# Patient Record
Sex: MALE | Race: WHITE
[De-identification: names, ages, dates, MRNs, and addresses within clinical notes are randomized per-mention and may not be internally consistent; named-entity substitution may affect disease eponyms.]

---

## 2019-04-23 ENCOUNTER — HOSPITAL ENCOUNTER (INPATIENT)
Dept: HOSPITAL 74 - JER | Age: 39
LOS: 2 days | Discharge: ANOTHER HOSPITAL | DRG: 198 | End: 2019-04-25
Attending: INTERNAL MEDICINE | Admitting: INTERNAL MEDICINE
Payer: COMMERCIAL

## 2019-04-23 VITALS — BODY MASS INDEX: 36.8 KG/M2

## 2019-04-23 DIAGNOSIS — F17.210: ICD-10-CM

## 2019-04-23 DIAGNOSIS — E66.9: ICD-10-CM

## 2019-04-23 DIAGNOSIS — E11.9: ICD-10-CM

## 2019-04-23 DIAGNOSIS — E78.5: ICD-10-CM

## 2019-04-23 DIAGNOSIS — R07.89: ICD-10-CM

## 2019-04-23 DIAGNOSIS — I20.0: Primary | ICD-10-CM

## 2019-04-23 DIAGNOSIS — I10: ICD-10-CM

## 2019-04-23 LAB
ALBUMIN SERPL-MCNC: 3.9 G/DL (ref 3.4–5)
ALP SERPL-CCNC: 77 U/L (ref 45–117)
ALT SERPL-CCNC: 37 U/L (ref 13–61)
ANION GAP SERPL CALC-SCNC: 5 MMOL/L (ref 8–16)
APTT BLD: 31.7 SECONDS (ref 25.2–36.5)
AST SERPL-CCNC: 18 U/L (ref 15–37)
BASOPHILS # BLD: 0.5 % (ref 0–2)
BILIRUB SERPL-MCNC: 0.4 MG/DL (ref 0.2–1)
BNP SERPL-MCNC: 19.4 PG/ML (ref 5–125)
BUN SERPL-MCNC: 13 MG/DL (ref 7–18)
CALCIUM SERPL-MCNC: 8.9 MG/DL (ref 8.5–10.1)
CHLORIDE SERPL-SCNC: 104 MMOL/L (ref 98–107)
CO2 SERPL-SCNC: 25 MMOL/L (ref 21–32)
CREAT SERPL-MCNC: 0.7 MG/DL (ref 0.55–1.3)
DEPRECATED RDW RBC AUTO: 13.3 % (ref 11.9–15.9)
EOSINOPHIL # BLD: 3.3 % (ref 0–4.5)
GLUCOSE SERPL-MCNC: 169 MG/DL (ref 74–106)
GLUCOSE SERPL-MCNC: 283 MG/DL (ref 74–106)
HCT VFR BLD CALC: 44.2 % (ref 35.4–49)
HGB BLD-MCNC: 15.3 GM/DL (ref 11.7–16.9)
INR BLD: 1.04 (ref 0.83–1.09)
LYMPHOCYTES # BLD: 42.1 % (ref 8–40)
MCH RBC QN AUTO: 31 PG (ref 25.7–33.7)
MCHC RBC AUTO-ENTMCNC: 34.5 G/DL (ref 32–35.9)
MCV RBC: 89.9 FL (ref 80–96)
MONOCYTES # BLD AUTO: 8.4 % (ref 3.8–10.2)
NEUTROPHILS # BLD: 45.7 % (ref 42.8–82.8)
PLATELET # BLD AUTO: 197 K/MM3 (ref 134–434)
PMV BLD: 8.2 FL (ref 7.5–11.1)
POTASSIUM SERPLBLD-SCNC: 4.4 MMOL/L (ref 3.5–5.1)
PROT SERPL-MCNC: 7.9 G/DL (ref 6.4–8.2)
PT PNL PPP: 12.3 SEC (ref 9.7–13)
RBC # BLD AUTO: 4.92 M/MM3 (ref 4–5.6)
SODIUM SERPL-SCNC: 135 MMOL/L (ref 136–145)
WBC # BLD AUTO: 7.9 K/MM3 (ref 4–10)

## 2019-04-23 PROCEDURE — A9502 TC99M TETROFOSMIN: HCPCS

## 2019-04-23 RX ADMIN — HEPARIN SODIUM SCH UNIT: 5000 INJECTION, SOLUTION INTRAVENOUS; SUBCUTANEOUS at 21:42

## 2019-04-23 RX ADMIN — ATORVASTATIN CALCIUM SCH MG: 40 TABLET, FILM COATED ORAL at 21:40

## 2019-04-23 RX ADMIN — INSULIN ASPART SCH UNITS: 100 INJECTION, SOLUTION INTRAVENOUS; SUBCUTANEOUS at 18:48

## 2019-04-23 RX ADMIN — INSULIN ASPART SCH UNITS: 100 INJECTION, SOLUTION INTRAVENOUS; SUBCUTANEOUS at 21:43

## 2019-04-23 NOTE — PN
Teaching Attending Note


Name of Resident: Mary Beth Ortega





ATTENDING PHYSICIAN STATEMENT





I saw and evaluated the patient.


I reviewed the resident's note and discussed the case with the resident.


I agree with the resident's findings and plan as documented.








SUBJECTIVE: Left sided chest pain








OBJECTIVE:





 Vital Signs











Temperature  97.8 F   19 07:41


 


Pulse Rate  71   19 09:52


 


Respiratory Rate  16   19 09:52


 


Blood Pressure  111/79   19 09:52


 


O2 Sat by Pulse Oximetry (%)  71 L  19 09:52











Young man not in distress, chest pain almost resolved





HEENT: Mm moist, no anemia, PERRLA EOMI





NECK: No JVd No Bruit





CHEST: CTA B/L





CVS: S1S2 R no m/g/r





ABD: No distention, non tender Bs +





EXT: No edema feet, no calf tenderness, CNS' AOX3 non focal





LABS:


 CBC, BMP





 19 08:55 





 19 08:55 





EKG: NSR at 66/mt R no acute ST T changes





ASSESSMENT AND PLAN: 38 yrs old man  H/O HTN, T2DM, , unlimited Et present with 

Left sided pressure like pain with perspiration and radiation to Left UE and 

back lasted 4-5 hrs, developed while sleeping 8/10 with SOB , pain gradually 

decrease in intensity came to Ed for evaluation received NTG symptoms  improved 

after S/L NTG, at the tme of examination c/o  some minimal discomfort and head 

ache, hemodynamically stable, EKG  no acute ST T changes Ist CE -ve, as per 

patient previously  + NST 9 months ago in [place]  and he was advised cardiac 

cath





CHEST Pain: Typical chest pain with multiple CAD risk factors, T2DM, HTN, 

smoking FHO Premature CAD (father  at the age of 59 with MI) admitted for 

evaluation of chest pain, Card Tele, ASA 81 mg Lipitor 40 mg daily serial CE, 

ECHO, optimize BP control , F/U cardiology input Card recommended Stress test 

in am if  serial CE are normal and patient stays CP free, 





HTN: Resume home meds or equivalent 





T2DM: F/U Lipid panel , TSH, HBA1C , Correction dose insulin.





SMOKING Cessation: Educate to stop smoking offerred Nicotine GUM








.>

## 2019-04-23 NOTE — PDOC
Attending Attestation





- Resident


Resident Name: Migdalia Miranda





- ED Attending Attestation


I have performed the following: I have examined & evaluated the patient, The 

case was reviewed & discussed with the resident, I agree w/resident's findings 

& plan





- HPI


HPI: 





04/23/19 10:11


38-year-old male with history of hypertension, high cholesterol, smoking history

, reportedly known CAD on stress test performed in his home country about 9 

months ago (referred for catheterization at that time but wanted to follow-up 

in the [place]) presents now for first episode of chest pain since that 

stress test 9 months ago. Patient was resting at home last night, developed 

left chest/shoulder discomfort with nausea and diaphoresis, mild throughout the 

night and awoke this morning with increased pain today presents for evaluation.


At baseline, patient has no exercise limitations and denies any exertional 

chest pain or dyspnea.





- Physicial Exam


PE: 





04/23/19 10:12


Vital signs are within normal limits


Patient seated comfortably in stretcher, diaphoretic upon arrival, EKG showed 

no acute ischemia


patient was given nitroglycerin with improved symptoms


Heart is regular without murmur, lungs are clear


No extremity edema or calf tenderness








- Medical Decision Making





04/23/19 10:13


38-year-old male with hypertension, diabetes, smoking history, positive family 

history and reportedly known CAD from stress test last year presents now with 

chest pain concerning for an unstable angina.


Labs, EKG, chest x-ray


asa, ntg


tele admit





04/23/19 11:45


labs wnl


case d/w Dr. Garrett of cardiology, agrees with tele admit


Accepted for inpt tele by Dr. Gagnon





**Heart Score/ECG Review





- History


History: Highly suspicious





- Electrocardiogram


EKG: Non specific repolarization disturbance





- Age


Age: </= 45





- Risk Factors


Risk Factors Heart Score: Yes Hx Hypertension, Yes Hx Diabetes, Yes Smoking 

History, Yes Positive family hx of cardiac disease


Based on the list above the patient has:: >/=3 risk factors or Hx 

atherosclerotic disease





- Troponin


Troponin: </= normal limit





- Score


Heart Score - Total: 5


  ** #1


ECG reviewed & interpreted by me at: 07:41


General ECG Interpretation: Sinus Rhythm, Normal Rate (66), Normal Intervals (

qtc 408), No acute ischemic changes (inf flat T, septal Q V1V2)

## 2019-04-23 NOTE — CON.CARD
Cardiology Consult (text)





- Consultation


Consultation Note: 





cc: cp





hpi:  38 m hx htn, hld, dm, smoking, here with cp.  Woke up early this morning 

and had left chest tightness with left arm numbness.  Mild sob as well.  No 

dizzy, loc, pnd, orthopnea, le edema.  Came to ER and got sl nitro and sxs 

resolved an hour later.  Feels well now.  Had similar episode last year when 

[place]seas and reports having abnl stress test there but chose not to have cath 

then.  





pmh: per hpi


psh: no surgery


social: +tob


fam: dad mi 58


ros: per hpi; all others normal


meds:


 Home Medications











 Medication  Instructions  Recorded


 


Nebivolol HCl [Bystolic] 10 mg PO DAILY 04/23/19


 


Pitavastatin Calcium [Livalo] 2 mg PO DAILY 04/23/19








pe:


 Vital Signs (72 hours)











  04/23/19 04/23/19 04/23/19





  07:41 08:15 09:24


 


Temperature 97.8 F  


 


Pulse Rate 74  


 


Pulse Rate [   65





Apical]   


 


Respiratory 18  16





Rate   


 


Blood Pressure 134/89  


 


Blood Pressure   135/89





[Left Arm]   


 


O2 Sat by Pulse 98 99 99





Oximetry (%)   














  04/23/19





  09:52


 


Temperature 


 


Pulse Rate 


 


Pulse Rate [ 71





Apical] 


 


Respiratory 16





Rate 


 


Blood Pressure 


 


Blood Pressure 111/79





[Left Arm] 


 


O2 Sat by Pulse 71 L





Oximetry (%) 








nad no jvd


rrr s1s2 no mrg


cta bl nl eff


aao3


no le e/c/c


abd nt nd pos bs 


no jaundice diaphoresis


pos dp pt no carotid bruits





 Laboratory Last Values











WBC  7.9 K/mm3 (4.0-10.0)   04/23/19  08:55    


 


RBC  4.92 M/mm3 (4.00-5.60)   04/23/19  08:55    


 


Hgb  15.3 GM/dL (11.7-16.9)   04/23/19  08:55    


 


Hct  44.2 % (35.4-49)   04/23/19  08:55    


 


MCV  89.9 fl (80-96)   04/23/19  08:55    


 


MCH  31.0 pg (25.7-33.7)   04/23/19  08:55    


 


MCHC  34.5 g/dl (32.0-35.9)   04/23/19  08:55    


 


RDW  13.3 % (11.9-15.9)   04/23/19  08:55    


 


Plt Count  197 K/MM3 (134-434)   04/23/19  08:55    


 


MPV  8.2 fl (7.5-11.1)   04/23/19  08:55    


 


Absolute Neuts (auto)  3.6 K/mm3 (1.5-8.0)   04/23/19  08:55    


 


Neutrophils %  45.7 % (42.8-82.8)   04/23/19  08:55    


 


Lymphocytes %  42.1 % (8-40)  H  04/23/19  08:55    


 


Monocytes %  8.4 % (3.8-10.2)   04/23/19  08:55    


 


Eosinophils %  3.3 % (0-4.5)   04/23/19  08:55    


 


Basophils %  0.5 % (0-2.0)   04/23/19  08:55    


 


Nucleated RBC %  0 % (0-0)   04/23/19  08:55    


 


PT with INR  12.30 SEC (9.7-13.0)   04/23/19  08:55    


 


INR  1.04  (0.83-1.09)   04/23/19  08:55    


 


PTT (Actin FS)  31.7 SECONDS (25.2-36.5)   04/23/19  08:55    


 


Sodium  135 mmol/L (136-145)  L  04/23/19  08:55    


 


Potassium  4.4 mmol/L (3.5-5.1)   04/23/19  08:55    


 


Chloride  104 mmol/L ()   04/23/19  08:55    


 


Carbon Dioxide  25 mmol/L (21-32)   04/23/19  08:55    


 


Anion Gap  5 MMOL/L (8-16)  L  04/23/19  08:55    


 


BUN  13 mg/dL (7-18)   04/23/19  08:55    


 


Creatinine  0.7 mg/dL (0.55-1.3)   04/23/19  08:55    


 


Creat Clearance w eGFR  126.90  (>60)   04/23/19  08:55    


 


Random Glucose  169 mg/dL ()  H  04/23/19  08:55    


 


Calcium  8.9 mg/dL (8.5-10.1)   04/23/19  08:55    


 


Total Bilirubin  0.4 mg/dL (0.2-1)   04/23/19  08:55    


 


AST  18 U/L (15-37)   04/23/19  08:55    


 


ALT  37 U/L (13-61)   04/23/19  08:55    


 


Alkaline Phosphatase  77 U/L ()   04/23/19  08:55    


 


Creatine Kinase  70 U/L ()   04/23/19  08:55    


 


Troponin I  < 0.02 ng/ml (0.00-0.05)   04/23/19  08:55    


 


B-Natriuretic Peptide  19.4 pg/ml (5-125)   04/23/19  08:55    


 


Total Protein  7.9 g/dl (6.4-8.2)   04/23/19  08:55    


 


Albumin  3.9 g/dl (3.4-5.0)   04/23/19  08:55    








ecg: sr nl intervals no ischemic changes





cxr: clear lungs








a/p:  38 m hx htn, hld, dm, smoking, here with cp.





cp:


-some typical features and pt has cad risk factors


-ecg unremarkable and first set enzymes wnl.  no further cp at this time.


-would continue to dawson, monitor on tele.  will check echo and nuclear stress 

test tomorrow if CE's remain normal.


-cont asa





htn:


-cont home bystolic





hld:


-cont home statin





tob use:


-smoking cessation discussed

## 2019-04-23 NOTE — PDOC
History of Present Illness





- General


Chief Complaint: Chest Pain


Stated Complaint: HIGH BP


Time Seen by Provider: 19 08:13





- History of Present Illness


Initial Comments: 


39yo M with PMH of HTN, DM, 15 pack-year smoker presenting with chest pain. 

Patient states he had this pain starting around 2am, described as "heaviness" 

and rated 7/10. The pain radiates to his left arm. Endorses diaphoresis, but no 

nausea or vomiting. His father  of a heart attack at age 58. The pain 

worsens when he lays flat, and nothing makes it better. He currently endorses a 

"burning" on the left side of his chest that travels up and down. Patient has 

had this pain before, about three or four months ago when he was in [place]. 

Patient was evaluated by a cardiologist there with a stress test and was 

recommended for what sounds like a catheterization, however patient opted to 

wait to get evaluated when he returned to the [place]. Patient does not have an 

established primary care physician or cardiologist here. No hemoptysis, no 

recent surgical history, no recent immobilization, no hormone use, no history 

of DVT or PE. He also notes a high blood pressure reading at home that was "110/

160." Denies fevers, chills, or abdominal pain. 





PCP: none (though his PCP is lasted as Dr. Reddy Seymour, patient does not 

recognize the name and has not seen a doctor in the [place] in years)


Cardio: none








Past History





- Past Medical History


Allergies/Adverse Reactions: 


 Allergies











Allergy/AdvReac Type Severity Reaction Status Date / Time


 


No Known Allergies Allergy   Verified 19 07:43











Home Medications: 


Ambulatory Orders





Nebivolol HCl [Bystolic] 10 mg PO DAILY 19 


Pitavastatin Calcium [Livalo] 2 mg PO DAILY 19 








COPD: No


Diabetes: Yes


HTN: Yes





- Surgical History


GI Surgery: No





- Immunization History


Immunization Up to Date: No





- Suicide/Smoking/Psychosocial Hx


Smoking History: Current every day smoker


Have you smoked in the past 12 months: Yes


Number of Cigarettes Smoked Daily: 12


Information on smoking cessation initiated: No


Hx Alcohol Use: No


Drug/Substance Use Hx: No





**Review of Systems





- Review of Systems


Comments:: 


Constitutional: no fever, +diaphoresis


HEENT: no throat pain, no dysphagia


Cardiovascular: +chest pain, no palpitations


Respiratory: no cough, no shortness of breath


Gastrointestinal: no abdominal pain, no nausea


Genitourinary: no dysuria, no frequency


Musculoskeletal: no myalgia, no arthralgia


Skin: no rash, no itching


Neurologic: no headache, +weakness








*Physical Exam





- Vital Signs


 Last Vital Signs











Temp Pulse Resp BP Pulse Ox


 


 97.8 F   74   18   134/89   98 


 


 19 07:41  19 07:41  19 07:41  19 07:41  19 07:41














- Physical Exam


Comments: 


General: Awake, alert, and fully oriented, in no acute distress


Head: No signs of trauma


Eyes: EOMI, sclera anicteric


ENT: Moist mucus membranes


Neck: Normal ROM, supple


Lungs: Lungs clear, Normal breath sounds


Cardio: Regular rhythm, S1 and S2 present


Abdomen: Soft, nontender. No guarding, no rebound, no masses


Extremities: Normal range of motion, Distal pulses present


SKIN: Warm, Dry, normal turgor


Neurologic: Cranial nerves II through XII grossly intact. Normal speech








ED Treatment Course





- LABORATORY


CBC & Chemistry Diagram: 


 19 08:55





 19 16:59





Medical Decision Making





- Medical Decision Making


39yo M with PMH of HTN, DM presenting with chest pain.


DDX including but not limited to ACS, Aortic dissection, Pericarditis, PE, PNA, 

MSK


Cardiac workup


324mg ASA


EKG: rate 66, QTc 408, NSR, flattened t/ twi in leads II, III, avF (no prior 

ekgs in chart)


19 08:46





HEART score is at least 4 given risk factors (smoker, HTN, DM, family history), 

abnormal EKG, and history (moderately suspicious)


CXR without acute pathology, my impression


Awaiting Tpn and other labs


Likely Telemetry obs admission for r/o ACS


19 09:26








 CBC











WBC  7.9 K/mm3 (4.0-10.0)   19  08:55    


 


RBC  4.92 M/mm3 (4.00-5.60)   19  08:55    


 


Hgb  15.3 GM/dL (11.7-16.9)   19  08:55    


 


Hct  44.2 % (35.4-49)   19  08:55    


 


MCV  89.9 fl (80-96)   19  08:55    


 


MCH  31.0 pg (25.7-33.7)   19  08:55    


 


MCHC  34.5 g/dl (32.0-35.9)   19  08:55    


 


RDW  13.3 % (11.9-15.9)   19  08:55    


 


Plt Count  197 K/MM3 (134-434)   19  08:55    


 


MPV  8.2 fl (7.5-11.1)   19  08:55    


 


Absolute Neuts (auto)  3.6 K/mm3 (1.5-8.0)   19  08:55    


 


Neutrophils %  45.7 % (42.8-82.8)   19  08:55    


 


Lymphocytes %  42.1 % (8-40)  H  19  08:55    


 


Monocytes %  8.4 % (3.8-10.2)   19  08:55    


 


Eosinophils %  3.3 % (0-4.5)   19  08:55    


 


Basophils %  0.5 % (0-2.0)   19  08:55    


 


Nucleated RBC %  0 % (0-0)   19  08:55    








No anemia or leukocytosis





19 09:41








 CMP











Sodium  135 mmol/L (136-145)  L  19  08:55    


 


Potassium  4.4 mmol/L (3.5-5.1)   19  08:55    


 


Chloride  104 mmol/L ()   19  08:55    


 


Carbon Dioxide  25 mmol/L (21-32)   19  08:55    


 


Anion Gap  5 MMOL/L (8-16)  L  19  08:55    


 


BUN  13 mg/dL (7-18)   19  08:55    


 


Creatinine  0.7 mg/dL (0.55-1.3)   19  08:55    


 


Creat Clearance w eGFR  126.90  (>60)   19  08:55    


 


Random Glucose  169 mg/dL ()  H  19  08:55    


 


Calcium  8.9 mg/dL (8.5-10.1)   19  08:55    


 


Total Bilirubin  0.4 mg/dL (0.2-1)   19  08:55    


 


AST  18 U/L (15-37)   19  08:55    


 


ALT  37 U/L (13-61)   19  08:55    


 


Alkaline Phosphatase  77 U/L ()   19  08:55    


 


Creatine Kinase  70 U/L ()   19  08:55    


 


Troponin I  < 0.02 ng/ml (0.00-0.05)   19  08:55    


 


Total Protein  7.9 g/dl (6.4-8.2)   19  08:55    


 


Albumin  3.9 g/dl (3.4-5.0)   19  08:55    








Tpn <0.02 undetectable


Electrolytes unremarkable





Patient reports some alleviation of chest pain after receiving sublingual nitro

, now rated 3/10


Hospitalist team paged


19 10:57





Dr. Duggan requested consultation with cardiology to determine whether patient 

would be better served at a center with a cardiac cath. 


Dr. Vance discussed case with cardio who recommended tele observation as 

patient has no cardiac stress testing/ECHO in this country.


Dr. Duggan made aware


Admission order placed








*DC/Admit/Observation/Transfer


Diagnosis at time of Disposition: 


Chest pain


Qualifiers:


 Chest pain type: unspecified Qualified Code(s): R07.9 - Chest pain, unspecified








- Discharge Dispostion


Condition at time of disposition: Guarded


Decision to Admit order: Yes





- Referrals





- Patient Instructions





- Post Discharge Activity

## 2019-04-23 NOTE — EKG
Test Reason : 

Blood Pressure : ***/*** mmHG

Vent. Rate : 066 BPM     Atrial Rate : 066 BPM

   P-R Int : 174 ms          QRS Dur : 092 ms

    QT Int : 390 ms       P-R-T Axes : -08 001 -04 degrees

   QTc Int : 408 ms

 

NORMAL SINUS RHYTHM

SEPTAL INFARCT , AGE UNDETERMINED

ABNORMAL ECG

Confirmed by MD MAL, PATRIZIA ([id number]) on 4/23/2019 12:25:55 PM

 

Referred By:             Confirmed By:PATRIZIA RESENDEZ MD

## 2019-04-24 LAB
ALBUMIN SERPL-MCNC: 3.7 G/DL (ref 3.4–5)
ALP SERPL-CCNC: 95 U/L (ref 45–117)
ALT SERPL-CCNC: 42 U/L (ref 13–61)
ANION GAP SERPL CALC-SCNC: 6 MMOL/L (ref 8–16)
APTT BLD: 34.3 SECONDS (ref 25.2–36.5)
AST SERPL-CCNC: 21 U/L (ref 15–37)
BASOPHILS # BLD: 0.8 % (ref 0–2)
BILIRUB SERPL-MCNC: 0.2 MG/DL (ref 0.2–1)
BUN SERPL-MCNC: 15 MG/DL (ref 7–18)
CALCIUM SERPL-MCNC: 8.8 MG/DL (ref 8.5–10.1)
CHLORIDE SERPL-SCNC: 102 MMOL/L (ref 98–107)
CHOLEST SERPL-MCNC: 170 MG/DL (ref 50–200)
CO2 SERPL-SCNC: 27 MMOL/L (ref 21–32)
CREAT SERPL-MCNC: 0.6 MG/DL (ref 0.55–1.3)
DEPRECATED RDW RBC AUTO: 13 % (ref 11.9–15.9)
EOSINOPHIL # BLD: 3.4 % (ref 0–4.5)
GLUCOSE SERPL-MCNC: 206 MG/DL (ref 74–106)
HCT VFR BLD CALC: 42.7 % (ref 35.4–49)
HDLC SERPL-MCNC: 19 MG/DL (ref 40–60)
HGB BLD-MCNC: 14.7 GM/DL (ref 11.7–16.9)
INR BLD: 1.01 (ref 0.83–1.09)
LYMPHOCYTES # BLD: 49.8 % (ref 8–40)
MAGNESIUM SERPL-MCNC: 2.1 MG/DL (ref 1.8–2.4)
MCH RBC QN AUTO: 31.2 PG (ref 25.7–33.7)
MCHC RBC AUTO-ENTMCNC: 34.5 G/DL (ref 32–35.9)
MCV RBC: 90.4 FL (ref 80–96)
MONOCYTES # BLD AUTO: 7.7 % (ref 3.8–10.2)
NEUTROPHILS # BLD: 38.3 % (ref 42.8–82.8)
PHOSPHATE SERPL-MCNC: 3.5 MG/DL (ref 2.5–4.9)
PLATELET # BLD AUTO: 186 K/MM3 (ref 134–434)
PMV BLD: 8.8 FL (ref 7.5–11.1)
POTASSIUM SERPLBLD-SCNC: 4 MMOL/L (ref 3.5–5.1)
PROT SERPL-MCNC: 7.3 G/DL (ref 6.4–8.2)
PT PNL PPP: 11.9 SEC (ref 9.7–13)
RBC # BLD AUTO: 4.72 M/MM3 (ref 4–5.6)
SODIUM SERPL-SCNC: 136 MMOL/L (ref 136–145)
TRIGL SERPL-MCNC: 656 MG/DL (ref 0–150)
WBC # BLD AUTO: 8.1 K/MM3 (ref 4–10)

## 2019-04-24 RX ADMIN — NEBIVOLOL HYDROCHLORIDE SCH: 10 TABLET ORAL at 10:18

## 2019-04-24 RX ADMIN — HEPARIN SODIUM SCH UNIT: 5000 INJECTION, SOLUTION INTRAVENOUS; SUBCUTANEOUS at 22:11

## 2019-04-24 RX ADMIN — HEPARIN SODIUM SCH: 5000 INJECTION, SOLUTION INTRAVENOUS; SUBCUTANEOUS at 13:24

## 2019-04-24 RX ADMIN — ATORVASTATIN CALCIUM SCH MG: 40 TABLET, FILM COATED ORAL at 22:10

## 2019-04-24 RX ADMIN — INSULIN ASPART SCH: 100 INJECTION, SOLUTION INTRAVENOUS; SUBCUTANEOUS at 17:44

## 2019-04-24 RX ADMIN — HEPARIN SODIUM SCH UNIT: 5000 INJECTION, SOLUTION INTRAVENOUS; SUBCUTANEOUS at 07:01

## 2019-04-24 RX ADMIN — INSULIN ASPART SCH: 100 INJECTION, SOLUTION INTRAVENOUS; SUBCUTANEOUS at 07:01

## 2019-04-24 RX ADMIN — ASPIRIN 81 MG SCH MG: 81 TABLET ORAL at 13:23

## 2019-04-24 RX ADMIN — NEBIVOLOL HYDROCHLORIDE SCH MG: 10 TABLET ORAL at 13:23

## 2019-04-24 RX ADMIN — ASPIRIN 81 MG SCH: 81 TABLET ORAL at 10:18

## 2019-04-24 RX ADMIN — INSULIN ASPART SCH: 100 INJECTION, SOLUTION INTRAVENOUS; SUBCUTANEOUS at 11:42

## 2019-04-24 RX ADMIN — NICOTINE SCH MG: 14 PATCH, EXTENDED RELEASE TRANSDERMAL at 17:45

## 2019-04-24 RX ADMIN — INSULIN ASPART SCH UNITS: 100 INJECTION, SOLUTION INTRAVENOUS; SUBCUTANEOUS at 22:11

## 2019-04-24 NOTE — ECHO
______________________________________________________________________________



Name: ANDREA MENDEZ                                     Exam:Adult Echocardiogram

MRN: [id number]         Study Date: 2019 09:19 AM

Age: 38 yrs

______________________________________________________________________________



Reason For Study: Chest pain

Height: 66 in        Weight: 220 lb        BSA: 2.1 m2



______________________________________________________________________________



MMode/2D Measurements & Calculations

IVSd: 1.1 cm                                          Ao root diam: 3.0 cm

LVIDd: 4.4 cm                                         LA dimension: 3.4 cm

LVIDs: 3.0 cm

LVPWd: 1.00 cm



_______________________________________________________

EDV(Teich): 89.3 ml                                   LVOT diam: 2.0 cm

ESV(Teich): 33.6 ml



Doppler Measurements & Calculations

MV E max tom: 62.2 cm/sec                                   Ao V2 max: 118.8 cm/sec

MV A max tom: 53.8 cm/sec                                   Ao max P.6 mmHg

MV E/A: 1.2                                                 Ao V2 mean: 79.2 cm/sec

                                                            Ao mean P.8 mmHg

                                                            Ao V2 VTI: 24.0 cm



_____________________________________________________________

Med Peak E' Tom: 8.2 cm/sec

Med E/e': 7.6

Lat Peak E' Tom: 11.1 cm/sec

Lat E/e': 5.6





______________________________________________________________________________

Procedure

A two-dimensional transthoracic echocardiogram with color flow and Doppler was performed.

Left Ventricle

The left ventricular size, thickness and function are normal. The left ventricular ejection fraction 
is

normal. The left ventricular wall motion is normal.

Right Ventricle

The right ventricle is normal in size and function.

Atria

Normal left and right atrial size and function.

Mitral Valve

There is mild mitral valve thickening. There is no mitral valve stenosis. There is trace mitral regur
gitation.

Tricuspid Valve

The tricuspid valve is not well visualized, but is grossly normal. There is no tricuspid stenosis. Th
ere was

insufficient TR detected to calculate RV systolic pressure.

Aortic Valve

The aortic valve is normal in structure and function. No hemodynamically significant valvular aortic 
stenosis.

No aortic regurgitation is present.

Pulmonic Valve

The pulmonic valve is not well visualized.

Great Vessels

The aortic root is normal size.

Pericardium/Pleura

There is no pericardial effusion.



______________________________________________________________________________



Interpretation Summary

The left ventricular size, thickness and function are normal

The left ventricular ejection fraction is normal.

The left ventricular wall motion is normal.

There is trace mitral regurgitation.

There was insufficient TR detected to calculate RV systolic pressure.







MD Akhil Moise 2019 11:25 AM

## 2019-04-24 NOTE — EKG
Test Reason : 

Blood Pressure : ***/*** mmHG

Vent. Rate : 067 BPM     Atrial Rate : 067 BPM

   P-R Int : 162 ms          QRS Dur : 090 ms

    QT Int : 386 ms       P-R-T Axes : -04 004 -03 degrees

   QTc Int : 407 ms

 

NORMAL SINUS RHYTHM

NORMAL ECG

WHEN COMPARED WITH ECG OF 23-APR-2019 07:41,

CRITERIA FOR SEPTAL INFARCT ARE NO LONGER PRESENT

Confirmed by SUJEY ACOSTA, SHIRAZ ([id number]) on 4/24/2019 3:09:57 PM

 

Referred By: IGNACIO VALERO           Confirmed By:SHIRAZ RM MD

## 2019-04-24 NOTE — PN
Progress Note, Physician





- Current Medication List


Current Medications: 


Active Medications





Aspirin (Asa -)  81 mg PO DAILY [facility]


   Last Admin: 04/24/19 13:23 Dose:  81 mg


Atorvastatin Calcium (Lipitor -)  40 mg PO HS [facility]


   Last Admin: 04/23/19 21:40 Dose:  40 mg


Heparin Sodium (Porcine) (Heparin -)  5,000 unit SQ TID [facility]


   Last Admin: 04/24/19 13:24 Dose:  Not Given


Insulin Aspart (Novolog Vial Sliding Scale -)  1 vial SQ ACHS [facility]; Protocol


   Last Admin: 04/24/19 11:42 Dose:  Not Given


Morphine Sulfate (Morphine Sulfate)  2 mg IVPUSH Q6H PRN


   PRN Reason: PAIN LEVEL 4 - 6


Nebivolol (Bystolic -)  10 mg PO DAILY [facility]


   Last Admin: 04/24/19 13:23 Dose:  10 mg


Nicotine Polacrilex (Nicorette Gum -)  2 mg BUC Q2H PRN


   PRN Reason: NICOTINE REPLACEMENT RX


Nitroglycerin (Nitrostat -)  0.4 mg SL ONCE PRN


   PRN Reason: chest pain











- Objective


Vital Signs: 


 Vital Signs











Temperature  98.1 F   04/24/19 08:28


 


Pulse Rate  62   04/24/19 08:28


 


Respiratory Rate  18   04/24/19 08:28


 


Blood Pressure  106/62   04/24/19 08:28


 


O2 Sat by Pulse Oximetry (%)  98   04/24/19 08:26











Labs: 


 CBC, BMP





 04/24/19 05:30 





 04/24/19 05:30 





 INR, PTT











INR  1.01  (0.83-1.09)   04/24/19  05:30    














Assessment/Plan





IMP:


1. Multiple cardiac risk factors


2. Chest pain syndrome: mixed features 





REC:


1. Cont ASA/statin/beta blocker, BP controlled


2. Smoking cessation


3. F/u nuclear stress test results.


Further reccs pending results.


Cont tele.

## 2019-04-24 NOTE — PN
Teaching Attending Note


Name of Resident: Mary Beth Ortega





ATTENDING PHYSICIAN STATEMENT





I saw and evaluated the patient.


I reviewed the resident's note and discussed the case with the resident.


I agree with the resident's findings and plan as documented with exceptions 

below.








SUBJECTIVE:


Patient seen and examined. No current chest pain. 





OBJECTIVE:


 Vital Signs











 Period  Temp  Pulse  Resp  BP Sys/Oquendo  Pulse Ox


 


 Last 24 Hr  97.7 F-98.3 F  62-78  18-20  106-140/62-73  98-98








 Intake & Output











 04/21/19 04/22/19 04/23/19 04/24/19





 23:59 23:59 23:59 23:59


 


Intake Total    200


 


Balance    200


 


Weight   228 lb 8 oz 








General: sitting in bed in no acute distress


Chest: CTAB, no rales or wheezing


Abdomen;Soft, NT, ND


Extremities: no edema





 Home Medications











 Medication  Instructions  Recorded


 


Nebivolol HCl [Bystolic] 10 mg PO DAILY 04/23/19


 


Pitavastatin Calcium [Livalo] 2 mg PO DAILY 04/23/19








Active Medications





Acetaminophen (Tylenol -)  650 mg PO Q6H PRN


   PRN Reason: PAIN LEVEL 1-5


Aspirin (Asa -)  81 mg PO DAILY [facility]


   Last Admin: 04/24/19 13:23 Dose:  81 mg


Atorvastatin Calcium (Lipitor -)  40 mg PO HS [facility]


   Last Admin: 04/23/19 21:40 Dose:  40 mg


Heparin Sodium (Porcine) (Heparin -)  5,000 unit SQ TID [facility]


   Last Admin: 04/24/19 13:24 Dose:  Not Given


Insulin Aspart (Novolog Vial Sliding Scale -)  1 vial SQ ACHS [facility]; Protocol


   Last Admin: 04/24/19 11:42 Dose:  Not Given


Nebivolol (Bystolic -)  10 mg PO DAILY [facility]


   Last Admin: 04/24/19 13:23 Dose:  10 mg


Nicotine Polacrilex (Nicorette Gum -)  2 mg BUC Q2H PRN


   PRN Reason: NICOTINE REPLACEMENT RX


Nitroglycerin (Nitrostat -)  0.4 mg SL ONCE PRN


   PRN Reason: chest pain





 Laboratory Results - last 24 hr











  04/23/19 04/23/19 04/23/19





  16:59 19:30 21:35


 


WBC   


 


RBC   


 


Hgb   


 


Hct   


 


MCV   


 


MCH   


 


MCHC   


 


RDW   


 


Plt Count   


 


MPV   


 


Absolute Neuts (auto)   


 


Neutrophils %   


 


Lymphocytes %   


 


Monocytes %   


 


Eosinophils %   


 


Basophils %   


 


Nucleated RBC %   


 


PT with INR   


 


INR   


 


PTT (Actin FS)   


 


Sodium   


 


Potassium   


 


Chloride   


 


Carbon Dioxide   


 


Anion Gap   


 


BUN   


 


Creatinine   


 


Creat Clearance w eGFR   


 


POC Glucometer    218


 


Random Glucose  283 H  


 


Hemoglobin A1c %   


 


Calcium   


 


Phosphorus   


 


Magnesium   


 


Total Bilirubin   


 


AST   


 


ALT   


 


Alkaline Phosphatase   


 


CK-MB (CK-2)  < 1.0  


 


Troponin I  < 0.02  < 0.02 


 


Total Protein   


 


Albumin   


 


Triglycerides   


 


Cholesterol   


 


Total LDL Cholesterol   


 


HDL Cholesterol   


 


TSH   














  04/24/19 04/24/19 04/24/19





  05:30 05:30 05:30


 


WBC  8.1  


 


RBC  4.72  


 


Hgb  14.7  


 


Hct  42.7  


 


MCV  90.4  


 


MCH  31.2  


 


MCHC  34.5  


 


RDW  13.0  


 


Plt Count  186  


 


MPV  8.8  


 


Absolute Neuts (auto)  3.1  


 


Neutrophils %  38.3 L  


 


Lymphocytes %  49.8 H  


 


Monocytes %  7.7  


 


Eosinophils %  3.4  


 


Basophils %  0.8  


 


Nucleated RBC %  0  


 


PT with INR   11.90 


 


INR   1.01 


 


PTT (Actin FS)   34.3 


 


Sodium    136


 


Potassium    4.0


 


Chloride    102


 


Carbon Dioxide    27


 


Anion Gap    6 L


 


BUN    15


 


Creatinine    0.6


 


Creat Clearance w eGFR    151.60


 


POC Glucometer   


 


Random Glucose    206 H


 


Hemoglobin A1c %   


 


Calcium    8.8


 


Phosphorus    3.5


 


Magnesium    2.1


 


Total Bilirubin    0.2


 


AST    21


 


ALT    42


 


Alkaline Phosphatase    95


 


CK-MB (CK-2)   


 


Troponin I   


 


Total Protein    7.3


 


Albumin    3.7


 


Triglycerides    656 H


 


Cholesterol    170


 


Total LDL Cholesterol    100


 


HDL Cholesterol    19 L


 


TSH    1.37














  04/24/19 04/24/19





  05:30 06:59


 


WBC  


 


RBC  


 


Hgb  


 


Hct  


 


MCV  


 


MCH  


 


MCHC  


 


RDW  


 


Plt Count  


 


MPV  


 


Absolute Neuts (auto)  


 


Neutrophils %  


 


Lymphocytes %  


 


Monocytes %  


 


Eosinophils %  


 


Basophils %  


 


Nucleated RBC %  


 


PT with INR  


 


INR  


 


PTT (Actin FS)  


 


Sodium  


 


Potassium  


 


Chloride  


 


Carbon Dioxide  


 


Anion Gap  


 


BUN  


 


Creatinine  


 


Creat Clearance w eGFR  


 


POC Glucometer   201


 


Random Glucose  


 


Hemoglobin A1c %  7.6 H 


 


Calcium  


 


Phosphorus  


 


Magnesium  


 


Total Bilirubin  


 


AST  


 


ALT  


 


Alkaline Phosphatase  


 


CK-MB (CK-2)  


 


Troponin I  


 


Total Protein  


 


Albumin  


 


Triglycerides  


 


Cholesterol  


 


Total LDL Cholesterol  


 


HDL Cholesterol  


 


TSH  








2Decho and stress test results reviewed





ASSESSMENT AND PLAN:


38 yom with PMHx of HTN, NIDDM, nicotine dependence, recently reported positive 

stress test in [place] admitted with chest pain





-Chest pain


-HTN


-NIDDM


-Nicotine dependence





Plan:


ACS ruled out, no events on telemetry


Positive stress test.


Cardiology input noted. 


Plan for transfer for cardiac cath


Continue ASA/statin/beta blocker.


ISS, 


DIspo pending transfer to [facility]. 


Discussed with patient, patient seen with cardiology Dr. Houston at 

bedside.

## 2019-04-24 NOTE — PN
Physical Exam: 


SUBJECTIVE: Patient seen and examined, feeling good.








OBJECTIVE:





 Vital Signs











 Period  Temp  Pulse  Resp  BP Sys/Oquendo  Pulse Ox


 


 Last 24 Hr  97.7 F-98.3 F  62-78  18-20  106-140/62-73  98-98











GENERAL: The patient is awake, alert, and fully oriented, in no acute distress.


HEAD: Normal with no signs of trauma.


EYES: Extraocular movements intact, sclera anicteric, conjunctiva clear.


ENT: Oropharynx clear without exudates, moist mucous membranes.


NECK: Trachea midline, full range of motion, supple. 


LUNGS: Clear to auscultation bilaterally, no wheezes, no crackles, no 


accessory muscle use. 


HEART: Regular rate and rhythm, S1, S2 without murmur, rub or gallop.


ABDOMEN: Obese, soft, nontender, nondistended, normoactive bowel sounds.


EXTREMITIES: no edema. 


NEUROLOGICAL: Normal speech, gait not observed.


PSYCH: Normal mood, normal affect.


SKIN: Warm, dry, no rashes.














 Laboratory Results - last 24 hr











  04/23/19 04/23/19 04/23/19





  16:59 19:30 21:35


 


WBC   


 


RBC   


 


Hgb   


 


Hct   


 


MCV   


 


MCH   


 


MCHC   


 


RDW   


 


Plt Count   


 


MPV   


 


Absolute Neuts (auto)   


 


Neutrophils %   


 


Lymphocytes %   


 


Monocytes %   


 


Eosinophils %   


 


Basophils %   


 


Nucleated RBC %   


 


PT with INR   


 


INR   


 


PTT (Actin FS)   


 


Sodium   


 


Potassium   


 


Chloride   


 


Carbon Dioxide   


 


Anion Gap   


 


BUN   


 


Creatinine   


 


Creat Clearance w eGFR   


 


POC Glucometer    218


 


Random Glucose  283 H  


 


Hemoglobin A1c %   


 


Calcium   


 


Phosphorus   


 


Magnesium   


 


Total Bilirubin   


 


AST   


 


ALT   


 


Alkaline Phosphatase   


 


CK-MB (CK-2)  < 1.0  


 


Troponin I  < 0.02  < 0.02 


 


Total Protein   


 


Albumin   


 


Triglycerides   


 


Cholesterol   


 


Total LDL Cholesterol   


 


HDL Cholesterol   


 


TSH   














  04/24/19 04/24/19 04/24/19





  05:30 05:30 05:30


 


WBC  8.1  


 


RBC  4.72  


 


Hgb  14.7  


 


Hct  42.7  


 


MCV  90.4  


 


MCH  31.2  


 


MCHC  34.5  


 


RDW  13.0  


 


Plt Count  186  


 


MPV  8.8  


 


Absolute Neuts (auto)  3.1  


 


Neutrophils %  38.3 L  


 


Lymphocytes %  49.8 H  


 


Monocytes %  7.7  


 


Eosinophils %  3.4  


 


Basophils %  0.8  


 


Nucleated RBC %  0  


 


PT with INR   11.90 


 


INR   1.01 


 


PTT (Actin FS)   34.3 


 


Sodium    136


 


Potassium    4.0


 


Chloride    102


 


Carbon Dioxide    27


 


Anion Gap    6 L


 


BUN    15


 


Creatinine    0.6


 


Creat Clearance w eGFR    151.60


 


POC Glucometer   


 


Random Glucose    206 H


 


Hemoglobin A1c %   


 


Calcium    8.8


 


Phosphorus    3.5


 


Magnesium    2.1


 


Total Bilirubin    0.2


 


AST    21


 


ALT    42


 


Alkaline Phosphatase    95


 


CK-MB (CK-2)   


 


Troponin I   


 


Total Protein    7.3


 


Albumin    3.7


 


Triglycerides    656 H


 


Cholesterol    170


 


Total LDL Cholesterol    100


 


HDL Cholesterol    19 L


 


TSH    1.37














  04/24/19 04/24/19 04/24/19





  05:30 06:59 17:39


 


WBC   


 


RBC   


 


Hgb   


 


Hct   


 


MCV   


 


MCH   


 


MCHC   


 


RDW   


 


Plt Count   


 


MPV   


 


Absolute Neuts (auto)   


 


Neutrophils %   


 


Lymphocytes %   


 


Monocytes %   


 


Eosinophils %   


 


Basophils %   


 


Nucleated RBC %   


 


PT with INR   


 


INR   


 


PTT (Actin FS)   


 


Sodium   


 


Potassium   


 


Chloride   


 


Carbon Dioxide   


 


Anion Gap   


 


BUN   


 


Creatinine   


 


Creat Clearance w eGFR   


 


POC Glucometer   201  277


 


Random Glucose   


 


Hemoglobin A1c %  7.6 H  


 


Calcium   


 


Phosphorus   


 


Magnesium   


 


Total Bilirubin   


 


AST   


 


ALT   


 


Alkaline Phosphatase   


 


CK-MB (CK-2)   


 


Troponin I   


 


Total Protein   


 


Albumin   


 


Triglycerides   


 


Cholesterol   


 


Total LDL Cholesterol   


 


HDL Cholesterol   


 


TSH   








Active Medications











Generic Name Dose Route Start Last Admin





  Trade Name Freq  PRN Reason Stop Dose Admin


 


Acetaminophen  650 mg  04/24/19 16:22  





  Tylenol -  PO   





  Q6H PRN   





  PAIN LEVEL 1-5   





     





     





     


 


Aspirin  81 mg  04/24/19 10:00  04/24/19 13:23





  Asa -  PO   81 mg





  DAILY ARI   Administration





     





     





     





     


 


Atorvastatin Calcium  40 mg  04/23/19 22:00  04/23/19 21:40





  Lipitor -  PO   40 mg





  HS [facility]   Administration





     





     





     





     


 


Heparin Sodium (Porcine)  5,000 unit  04/23/19 22:00  04/24/19 13:24





  Heparin -  SQ   Not Given





  TID [facility]   





     





     





     





     


 


Insulin Aspart  1 vial  04/23/19 16:30  04/24/19 17:44





  Novolog Vial Sliding Scale -  SQ   Not Given





  [facility]   





     





     





  Protocol   





     


 


Nebivolol  10 mg  04/24/19 10:00  04/24/19 13:23





  Bystolic -  PO   10 mg





  DAILY ARI   Administration





     





     





     





     


 


Nicotine  14 mg  04/24/19 17:00  04/24/19 17:45





  Nicoderm Patch -  TD   14 mg





  DAILY ARI   Administration





     





     





     





     


 


Nitroglycerin  0.4 mg  04/23/19 15:18  





  Nitrostat -  SL   





  ONCE PRN   





  chest pain   





     





     





     











ASSESSMENT/PLAN:


The patient is a 38 year old male with a PMH of DMII, hypertension who 

presented complaining of chest pain, admitted to r/o ACS.





r/o ACS:


-the patient presented with typical chest pain, HEART score 4, 


-no acute EKG changes, NSR, no hoang/std and normal troponin


-consulted Cardiology, had positive stress test today, will be transfered to [facility] for cardiac catheterization


-trponins normal


-cont Bystolic and Lipitor, no AC recommended by Cardiology


-NTG sl as needed


-ECHO reviewed





Neck pain:


-the patient is still complaining of neck pain and numbness in upper extremities


-x ray normal


DM:


-ISS ACHS


-BGM ACSH


-Hg A1c 7.6


-hold home medication





HTN:


-cont Bystolic PO


-monitor





Nicotine dependance:


-nicotine gum, counseling





F/E/N:


no/no changes/NPO





Disposition: telemetry monitoring, waiting for transfer to [facility]














Problem List





- Problems


(1) Chest pain


Code(s): R07.9 - CHEST PAIN, UNSPECIFIED   


Qualifiers: 


   Chest pain type: unspecified   Qualified Code(s): R07.9 - Chest pain, 

unspecified   





(2) Hypertension


Code(s): I10 - ESSENTIAL (PRIMARY) HYPERTENSION   





(3) Diabetes


Code(s): E11.9 - TYPE 2 DIABETES MELLITUS WITHOUT COMPLICATIONS   





Visit type





- Emergency Visit


Emergency Visit: Yes


ED Registration Date: 04/23/19


Care time: The patient presented to the Emergency Department on the above date 

and was hospitalized for further evaluation of their emergent condition.





- New Patient


This patient is new to me today: No





- Critical Care


Critical Care patient: No





- Discharge Referral


Referred to [facility] No

## 2019-04-25 VITALS — HEART RATE: 62 BPM | DIASTOLIC BLOOD PRESSURE: 75 MMHG | SYSTOLIC BLOOD PRESSURE: 119 MMHG | TEMPERATURE: 98.4 F

## 2019-04-25 RX ADMIN — ASPIRIN 81 MG SCH MG: 81 TABLET ORAL at 10:36

## 2019-04-25 RX ADMIN — NICOTINE SCH MG: 14 PATCH, EXTENDED RELEASE TRANSDERMAL at 10:35

## 2019-04-25 RX ADMIN — HEPARIN SODIUM SCH: 5000 INJECTION, SOLUTION INTRAVENOUS; SUBCUTANEOUS at 06:29

## 2019-04-25 RX ADMIN — NEBIVOLOL HYDROCHLORIDE SCH MG: 10 TABLET ORAL at 10:36

## 2019-04-25 RX ADMIN — INSULIN ASPART SCH: 100 INJECTION, SOLUTION INTRAVENOUS; SUBCUTANEOUS at 06:28

## 2019-04-25 NOTE — PN
Progress Note (short form)





- Note


Progress Note: 


s: no  sob palps dizzy; mild cp this AM, resolved





 Current Medications











Generic Name Dose Route Start Last Admin





  Trade Name Doris  PRN Reason Stop Dose Admin


 


Acetaminophen  650 mg  04/24/19 16:22  





  Tylenol -  PO   





  Q6H PRN   





  PAIN LEVEL 1-5   





     





     





     


 


Aspirin  81 mg  04/24/19 10:00  04/25/19 10:36





  Asa -  PO   81 mg





  DAILY ARI   Administration





     





     





     





     


 


Atorvastatin Calcium  40 mg  04/23/19 22:00  04/24/19 22:10





  Lipitor -  PO   40 mg





  HS ARI   Administration





     





     





     





     


 


Heparin Sodium (Porcine)  5,000 unit  04/23/19 22:00  04/25/19 06:29





  Heparin -  SQ   Not Given





  TID [facility]   





     





     





     





     


 


Insulin Aspart  1 vial  04/23/19 16:30  04/25/19 06:28





  Novolog Vial Sliding Scale -  SQ   Not Given





  [facility]   





     





     





  Protocol   





     


 


Nebivolol  10 mg  04/24/19 10:00  04/25/19 10:36





  Bystolic -  PO   10 mg





  DAILY ARI   Administration





     





     





     





     


 


Nicotine  14 mg  04/24/19 17:00  04/25/19 10:35





  Nicoderm Patch -  TD   14 mg





  DAILY ARI   Administration





     





     





     





     


 


Nitroglycerin  0.4 mg  04/23/19 15:18  





  Nitrostat -  SL   





  ONCE PRN   





  chest pain   





     





     





     














o:


  Vital Signs











 Period  Temp  Pulse  Resp  BP Sys/Oquendo  Pulse Ox


 


 Last 24 Hr  97.7 F-98.9 F  62-72  18-20  114-140/59-90  96-98











nad no jvd


rrr s1s2 no mrg


cta bl nl eff


aao3


no le e/c/c


abd nt nd pos bs 


no jaundice diaphoresis








  Laboratory Last Values











WBC  8.1 K/mm3 (4.0-10.0)   04/24/19  05:30    


 


RBC  4.72 M/mm3 (4.00-5.60)   04/24/19  05:30    


 


Hgb  14.7 GM/dL (11.7-16.9)   04/24/19  05:30    


 


Hct  42.7 % (35.4-49)   04/24/19  05:30    


 


MCV  90.4 fl (80-96)   04/24/19  05:30    


 


MCH  31.2 pg (25.7-33.7)   04/24/19  05:30    


 


MCHC  34.5 g/dl (32.0-35.9)   04/24/19  05:30    


 


RDW  13.0 % (11.9-15.9)   04/24/19  05:30    


 


Plt Count  186 K/MM3 (134-434)   04/24/19  05:30    


 


MPV  8.8 fl (7.5-11.1)   04/24/19  05:30    


 


Absolute Neuts (auto)  3.1 K/mm3 (1.5-8.0)   04/24/19  05:30    


 


Neutrophils %  38.3 % (42.8-82.8)  L  04/24/19  05:30    


 


Lymphocytes %  49.8 % (8-40)  H  04/24/19  05:30    


 


Monocytes %  7.7 % (3.8-10.2)   04/24/19  05:30    


 


Eosinophils %  3.4 % (0-4.5)   04/24/19  05:30    


 


Basophils %  0.8 % (0-2.0)   04/24/19  05:30    


 


Nucleated RBC %  0 % (0-0)   04/24/19  05:30    


 


PT with INR  11.90 SEC (9.7-13.0)   04/24/19  05:30    


 


INR  1.01  (0.83-1.09)   04/24/19  05:30    


 


PTT (Actin FS)  34.3 SECONDS (25.2-36.5)   04/24/19  05:30    


 


Sodium  136 mmol/L (136-145)   04/24/19  05:30    


 


Potassium  4.0 mmol/L (3.5-5.1)   04/24/19  05:30    


 


Chloride  102 mmol/L ()   04/24/19  05:30    


 


Carbon Dioxide  27 mmol/L (21-32)   04/24/19  05:30    


 


Anion Gap  6 MMOL/L (8-16)  L  04/24/19  05:30    


 


BUN  15 mg/dL (7-18)   04/24/19  05:30    


 


Creatinine  0.6 mg/dL (0.55-1.3)   04/24/19  05:30    


 


Creat Clearance w eGFR  151.60  (>60)   04/24/19  05:30    


 


POC Glucometer  179 UNITS ()   04/25/19  06:00    


 


Random Glucose  206 mg/dL ()  H  04/24/19  05:30    


 


Hemoglobin A1c %  7.6 % (4.2-6.3)  H  04/24/19  05:30    


 


Calcium  8.8 mg/dL (8.5-10.1)   04/24/19  05:30    


 


Phosphorus  3.5 mg/dL (2.5-4.9)   04/24/19  05:30    


 


Magnesium  2.1 mg/dL (1.8-2.4)   04/24/19  05:30    


 


Total Bilirubin  0.2 mg/dL (0.2-1)   04/24/19  05:30    


 


AST  21 U/L (15-37)   04/24/19  05:30    


 


ALT  42 U/L (13-61)   04/24/19  05:30    


 


Alkaline Phosphatase  95 U/L ()   04/24/19  05:30    


 


Creatine Kinase  70 U/L ()   04/23/19  08:55    


 


CK-MB (CK-2)  < 1.0 ng/mL (0.5-3.6)   04/23/19  16:59    


 


Troponin I  < 0.02 ng/ml (0.00-0.05)   04/23/19  19:30    


 


B-Natriuretic Peptide  19.4 pg/ml (5-125)   04/23/19  08:55    


 


Total Protein  7.3 g/dl (6.4-8.2)   04/24/19  05:30    


 


Albumin  3.7 g/dl (3.4-5.0)   04/24/19  05:30    


 


Triglycerides  656 mg/dL (0-150)  H  04/24/19  05:30    


 


Cholesterol  170 mg/dL ()   04/24/19  05:30    


 


Total LDL Cholesterol  100 mg/dL (5-100)   04/24/19  05:30    


 


HDL Cholesterol  19 mg/dL (40-60)  L  04/24/19  05:30    


 


TSH  1.37 uIU/ml (0.358-3.74)   04/24/19  05:30    











ecg: sr nl intervals no ischemic changes





cxr: clear lungs





tele:  sr





mibi 4/2019: mild inferior ischemia





echo 4/2019:  nl lv/rv, no sig valve path








a/p:  38 m hx htn, hld, dm, smoking, here with cp.





cp:


-some typical features and pt has cad risk factors


-trops negative


-echo benign


-mibi with inferior ischemia.  given his sxs, risk factors, and abnl mibi, will 

send for cath today.


-cont asa





htn:


-cont home bystolic





hld:


-cont home statin





tob use:


-smoking cessation discussed

## 2019-04-25 NOTE — PN
Teaching Attending Note


Name of Resident: Mary Beth Ortega





ATTENDING PHYSICIAN STATEMENT





I saw and evaluated the patient.


I reviewed the resident's note and discussed the case with the resident.


I agree with the resident's findings and plan as documented with exceptions 

below.








SUBJECTIVE:


Patient seen and examined. no complaints. 





OBJECTIVE:


 Vital Signs











 Period  Temp  Pulse  Resp  BP Sys/Oqeundo  Pulse Ox


 


 Last 24 Hr  97.7 F-98.9 F  62-72  18-20  114-140/59-90  96-98








 Intake & Output











 04/22/19 04/23/19 04/24/19 04/25/19





 23:59 23:59 23:59 23:59


 


Intake Total   980 450


 


Balance   980 450


 


Weight  228 lb 8 oz  








General: sitting in bed in no acute distress


Chest: CTAB, no rales or wheezing


Abdomen:Soft, obese, NT


Extremities: no edema


neck: soft, supple, no JVD





 Home Medications











 Medication  Instructions  Recorded


 


Nebivolol HCl [Bystolic] 10 mg PO DAILY 04/23/19


 


Pitavastatin Calcium [Livalo] 2 mg PO DAILY 04/23/19


 


Aspirin [ASA -] 81 mg PO DAILY  tab.chew 04/25/19


 


Atorvastatin Ca [Lipitor] 40 mg PO HS  tablet 04/25/19


 


Nicotine Patch [Nicoderm Patch -] 14 mg TD DAILY  patch 04/25/19


 


Nitroglycerin Sublingual 0.4 mg SL ONCE PRN  tab 04/25/19





[Nitrostat -]  


 


Sitagliptin Phos/Metformin HCl 1 each PO DAILY #30 tablet 04/25/19





[Janumet 50-1,000 mg Tablet]  








 Laboratory Results - last 24 hr











  04/24/19 04/24/19 04/25/19





  17:39 21:01 06:00


 


POC Glucometer  277  217  179














ASSESSMENT AND PLAN:


38 yom with PMHx of HTN, NIDDM, nicotine dependence, recently reported positive 

stress test in [place] admitted with chest pain





-Chest pain


-HTN


-NIDDM


-Nicotine dependence





Plan:


ACS ruled out, no events on telemetry


Positive stress test.


Cardiology input noted. 


Plan for transfer for cardiac cath today


Continue ASA/statin/beta blocker.


ISS, 


DIspo transfer to [facility] today. 


Discussed with patient, all questions answered.

## 2019-04-25 NOTE — DS
Physical Exam: 


SUBJECTIVE: Patient seen and examined, felling numbness of left arm today and 

had mild chest pain this morning.








OBJECTIVE:





 Vital Signs











 Period  Temp  Pulse  Resp  BP Sys/Oquendo  Pulse Ox


 


 Last 24 Hr  97.7 F-98.9 F  62-72  18-20  114-140/59-90  96-98








PHYSICAL EXAM





GENERAL: The patient is awake, alert, and fully oriented, in no acute distress.


HEAD: Normal with no signs of trauma.


EYES: Extraocular movements intact, conjunctiva clear. 


ENT: Moist mucous membranes.


NECK: Trachea midline, full range of motion, supple. 


LUNGS: Breath sounds equal, clear to auscultation bilaterally.


HEART: Regular rate and rhythm, S1, S2 without murmur, rub or gallop.


ABDOMEN: Obese, soft, nontender, nondistended, normoactive bowel sounds.


EXTREMITIES: 2+ pulses, no edema. 


NEUROLOGICAL: Normal speech, gait not observed.


PSYCH: Normal mood, normal affect.


SKIN: Warm, dry, no rashes or lesions noted.





LABS


 Laboratory Results - last 24 hr











  04/24/19 04/24/19 04/25/19





  17:39 21:01 06:00


 


POC Glucometer  277  217  179











HOSPITAL COURSE:


The patient is a 38 year old male with a PMH of DMII, hypertension who 

presented complaining of chest pain that started several hours before coming to 

the hospital. The pain started suddenly, awoke him from sleep, 10/10, located 

on left side of the chest, radiated to the neck and associated with numbness of 

left upper extremity, pressure like. The patient states that the pain lasted 

for few hours. The patient had relieve of his symptoms after he was given NTG 

in emergency room. The patient had positive stress test 9 months ago in 

[place] but decided not to undergo further treatment at that time. The 

patient reports weakness for the past 2-3 weeks that got worse in the past 3 

days.


he was admitted to telemetry for cardiac monitoring. his ekg, troponins were 

normal. He was seen by Cardiology and had positive stress test done for 

inferior ischemia. We also managed his hypertension and diabetes. 

Recommendation was made to transfer the patient to tertiary care center for 

cardiac catheterization. Risks and benefits discussed with the patient who 

agreed to the plan.





Date of Admission:04/23/19





Date of Discharge: 04/25/19











Minutes to complete discharge: 35





Discharge Summary


Reason For Visit: UNSTABLE ANGINA PECTORIS


Current Active Problems





Chest pain (Acute)


Diabetes (Acute)


Hypertension (Acute)








Condition: Guarded





- Instructions


Diet, Activity, Other Instructions: 


You were admitted to the hospital for chest pain and being transferred to 

[facility] for cardiac catheterization.


Referrals: 


Fede Houston MD [Staff Physician] - 


Disposition: TRANSFER ACUTE CARE/OTHER HOSP





- Home Medications


Comprehensive Discharge Medication List: 


Ambulatory Orders





Nebivolol HCl [Bystolic] 10 mg PO DAILY 04/23/19 


Pitavastatin Calcium [Livalo] 2 mg PO DAILY 04/23/19 


Sitagliptin Phos/Metformin HCl [Janumet 50-1,000 mg Tablet] 1 each PO DAILY #30 

tablet 04/25/19 











Problem List





- Problems


(1) Chest pain


Code(s): R07.9 - CHEST PAIN, UNSPECIFIED   


Qualifiers: 


   Chest pain type: unspecified   Qualified Code(s): R07.9 - Chest pain, 

unspecified   





(2) Hypertension


Code(s): I10 - ESSENTIAL (PRIMARY) HYPERTENSION   





(3) Diabetes


Code(s): E11.9 - TYPE 2 DIABETES MELLITUS WITHOUT COMPLICATIONS   


This patient is new to me today: No


Emergency Visit: Yes


ED Registration Date: 04/23/19


Care time: The patient presented to the Emergency Department on the above date 

and was hospitalized for further evaluation of their emergent condition.


Critical Care patient: No





- Discharge Referral


Referred to [facility] No

## 2022-06-20 NOTE — HP
CHIEF COMPLAINT:


chest pain


PCP: None





HISTORY OF PRESENT ILLNESS:


The patient is a 38 year old male with a PMH of DMII, hypertension who 

presented complaining of chest pain that started last night around 2 AM. The 

pain started suddenly, awoke him from sleep, 10/10, located on left side of the 

chest, radiated to the neck and associated with numbness of left upper extremity

, pressure like. The patient states that the pain lasted for few hours. The 

patient had relieve of his symptoms after he was given NTG in emergency room. 

The patient had positive stress test 9 months ago in [place] but decided not 

to undergo further treatment at that time. The patient reports weakness for the 

past 2-3 weeks that got worse in the past 3 days.


He denies palpitations, SOB, dizziness. He is compliant with his medications 

but doesn't have PCP in [place].





ER course was notable for:


(1)EKG


(2)troponin neg


(3)Cardiology consultation





PAST MEDICAL HISTORY:


as above





PAST SURGICAL HISTORY:


none





Social History:


Smoking:yes, current smoker, 1 pack a day for 15 years


Alcohol:occasionally


Drugs: no





Family History:


Father  due to MI at age 59


many family members on father's side with heart disease


Mother: DM





Allergies





No Known Allergies Allergy (Verified 19 07:43)


 








HOME MEDICATIONS:


 Home Medications











 Medication  Instructions  Recorded


 


Nebivolol HCl [Bystolic] 10 mg PO DAILY 19


 


Pitavastatin Calcium [Livalo] 2 mg PO DAILY 19








REVIEW OF SYSTEMS


CONSTITUTIONAL: 


Absent:  fever, chills, diaphoresis, generalized weakness, malaise, loss of 

appetite, weight change


HEENT: 


Absent:  rhinorrhea, nasal congestion, throat pain, throat swelling, difficulty 

swallowing, visual changes 


CARDIOVASCULAR: chest pain,


Absent: syncope, palpitations, irregular heart rate, lightheadedness, 

peripheral edema


RESPIRATORY: 


Absent: cough, shortness of breath, dyspnea with exertion, orthopnea, wheezing, 

stridor, hemoptysis


GASTROINTESTINAL:


Absent: abdominal pain, nausea, vomiting, diarrhea, constipation, melena, 

hematochezia


GENITOURINARY: 


Absent: dysuria, frequency, urgency, hesitancy, hematuria, flank pain, genital 

pain


MUSCULOSKELETAL: neck pain


Absent: myalgia, arthralgia, joint swelling, back pain 


HEMATOLOGIC/IMMUNOLOGIC: 


Absent: easy bleeding, easy bruising


ENDOCRINE:


Absent: unexplained weight gain, unexplained weight loss, heat intolerance, 

cold intolerance


NEUROLOGIC: 


Absent: headache, focal weakness or paresthesias, dizziness, unsteady gait, 

seizure


PSYCHIATRIC: 


Absent: anxiety, depression








PHYSICAL EXAMINATION


 Vital Signs - 24 hr











  19





  07:41 08:15 09:24


 


Temperature 97.8 F  


 


Pulse Rate 74  


 


Pulse Rate [   65





Apical]   


 


Respiratory 18  16





Rate   


 


Blood Pressure 134/89  


 


Blood Pressure   135/89





[Left Arm]   


 


O2 Sat by Pulse 98 99 99





Oximetry (%)   














  19





  09:52


 


Temperature 


 


Pulse Rate 


 


Pulse Rate [ 71





Apical] 


 


Respiratory 16





Rate 


 


Blood Pressure 


 


Blood Pressure 111/79





[Left Arm] 


 


O2 Sat by Pulse 71 L





Oximetry (%) 











GENERAL: Awake, alert, and fully oriented, in no acute distress.


HEAD: Normal with no signs of trauma.


EYES: Extraocular movements intact, sclera anicteric, conjunctiva clear.


EARS, NOSE, THROAT: Oropharynx clear without exudates. Moist mucous membranes.


NECK: Normal range of motion, supple without lymphadenopathy, JVD, or masses.


LUNGS: Breath sounds equal, clear to auscultation bilaterally. No wheezes, and 

no crackles. No accessory muscle use.


HEART: Regular rate and rhythm, normal S1 and S2 without murmur, rub or gallop.


ABDOMEN: Obese, soft, nontender, normoactive bowel sounds, no guarding, no 

rebound, no masses. 


MUSCULOSKELETAL: Normal range of motion at all joints. No bony deformities or 

tenderness.


UPPER EXTREMITIES:  No peripheral edema.


LOWER EXTREMITIES: 2+ pulses, no peripheral edema. 


NEUROLOGICAL: Non focal. Normal speech. Normal gait.


PSYCHIATRIC: Cooperative. Good eye contact. Appropriate mood and affect.


SKIN: Warm, dry, normal turgor, no rashes or lesions.





 Laboratory Results - last 24 hr











  19





  08:55 08:55 08:55


 


WBC  7.9  


 


RBC  4.92  


 


Hgb  15.3  


 


Hct  44.2  


 


MCV  89.9  


 


MCH  31.0  


 


MCHC  34.5  


 


RDW  13.3  


 


Plt Count  197  


 


MPV  8.2  


 


Absolute Neuts (auto)  3.6  


 


Neutrophils %  45.7  


 


Lymphocytes %  42.1 H  


 


Monocytes %  8.4  


 


Eosinophils %  3.3  


 


Basophils %  0.5  


 


Nucleated RBC %  0  


 


PT with INR   12.30 


 


INR   1.04 


 


PTT (Actin FS)   31.7 


 


Sodium    135 L


 


Potassium    4.4


 


Chloride    104


 


Carbon Dioxide    25


 


Anion Gap    5 L


 


BUN    13


 


Creatinine    0.7


 


Creat Clearance w eGFR    126.90


 


Random Glucose    169 H


 


Calcium    8.9


 


Total Bilirubin    0.4


 


AST    18


 


ALT    37


 


Alkaline Phosphatase    77


 


Creatine Kinase    70


 


Troponin I    < 0.02


 


Total Protein    7.9


 


Albumin    3.9











ASSESSMENT/PLAN:


The patient is a 38 year old male with a PMH of DMII, hypertension who 

presented complaining of chest pain, admitted to r/o ACS.





r/o ACS:


-the patient presented with typical chest pain, HEART score 4, 


-no acute EKG changes, NSR, no hoang/std and normal troponin


-consulted Cardiology, will follow up recommendations


-f/u troponin at 15 and 21:00, f/u EKG this afternoon


-cont Bystolic and Lipitor, no AC recommended by Cardiology


-NTG sl as needed


-f/u ECHO


-tomorrow for stress test





Neck pain:


-the patient is still complaining of neck pain and numbness in upper extremities


-will f/u x ray





DM:


-ISS ACHS


-BGM ACS


-Hg A1c ordered


-hold home medication





HTN:


-cont Bystolic PO


-monitor





Nicotine dependance:


-nicotine gum, counseling





F/E/N:


no/no changes/MPO





Disposition: telemetry monitoring, stress test tomorrow











Problem List





- Problem


(1) Chest pain


Code(s): R07.9 - CHEST PAIN, UNSPECIFIED   





(2) Hypertension


Code(s): I10 - ESSENTIAL (PRIMARY) HYPERTENSION   





(3) Diabetes


Code(s): E11.9 - TYPE 2 DIABETES MELLITUS WITHOUT COMPLICATIONS   





Visit type





- Emergency Visit


Emergency Visit: Yes


ED Registration Date: 19


Care time: The patient presented to the Emergency Department on the above date 

and was hospitalized for further evaluation of their emergent condition.





- New Patient


This patient is new to me today: Yes


Date on this admission: 19





- Critical Care


Critical Care patient: No 60 y/o M with PMHx throat / tongue cancer on Chemo/ Radiation, asthma, who presents to the ED from radiation center today for shortness of breath. Sepsis workup, IVFs, abx, trop, CT angio chest to r/o PE.

## 2023-08-24 ENCOUNTER — HOSPITAL ENCOUNTER (OUTPATIENT)
Dept: HOSPITAL 74 - JER | Age: 43
Setting detail: OBSERVATION
LOS: 2 days | Discharge: HOME | End: 2023-08-26
Attending: FAMILY MEDICINE | Admitting: INTERNAL MEDICINE
Payer: COMMERCIAL

## 2023-08-24 VITALS — BODY MASS INDEX: 29.1 KG/M2

## 2023-08-24 DIAGNOSIS — Z95.5: ICD-10-CM

## 2023-08-24 DIAGNOSIS — F17.200: ICD-10-CM

## 2023-08-24 DIAGNOSIS — E78.5: ICD-10-CM

## 2023-08-24 DIAGNOSIS — E11.9: ICD-10-CM

## 2023-08-24 DIAGNOSIS — I25.10: Primary | ICD-10-CM

## 2023-08-24 DIAGNOSIS — I10: ICD-10-CM

## 2023-08-24 LAB
ALBUMIN SERPL-MCNC: 3.7 G/DL (ref 3.4–5)
ALP SERPL-CCNC: 92 U/L (ref 45–117)
ALT SERPL-CCNC: 48 U/L (ref 13–61)
ANION GAP SERPL CALC-SCNC: 9 MMOL/L (ref 8–16)
APTT BLD: 32.7 SECONDS (ref 25.2–36.5)
AST SERPL-CCNC: 19 U/L (ref 15–37)
BASOPHILS # BLD: 0.7 % (ref 0–2)
BILIRUB SERPL-MCNC: 0.3 MG/DL (ref 0.2–1)
BNP SERPL-MCNC: 21 PG/ML (ref 5–125)
BUN SERPL-MCNC: 16.8 MG/DL (ref 7–18)
CALCIUM SERPL-MCNC: 8.4 MG/DL (ref 8.5–10.1)
CHLORIDE SERPL-SCNC: 107 MMOL/L (ref 98–107)
CO2 SERPL-SCNC: 26 MMOL/L (ref 21–32)
CREAT SERPL-MCNC: 0.9 MG/DL (ref 0.55–1.3)
DEPRECATED RDW RBC AUTO: 13.5 % (ref 11.9–15.9)
EOSINOPHIL # BLD: 4 % (ref 0–4.5)
GLUCOSE SERPL-MCNC: 254 MG/DL (ref 74–106)
HCT VFR BLD CALC: 45.8 % (ref 35.4–49)
HGB BLD-MCNC: 15.9 GM/DL (ref 11.7–16.9)
INR BLD: 1.03 (ref 0.83–1.09)
LYMPHOCYTES # BLD: 42.4 % (ref 8–40)
MCH RBC QN AUTO: 31.5 PG (ref 25.7–33.7)
MCHC RBC AUTO-ENTMCNC: 34.8 G/DL (ref 32–35.9)
MCV RBC: 90.5 FL (ref 80–96)
MONOCYTES # BLD AUTO: 6.2 % (ref 3.8–10.2)
NEUTROPHILS # BLD: 46.7 % (ref 42.8–82.8)
PLATELET # BLD AUTO: 174 10^3/UL (ref 134–434)
PMV BLD: 8.3 FL (ref 7.5–11.1)
POTASSIUM SERPLBLD-SCNC: 3.8 MMOL/L (ref 3.5–5.1)
PROT SERPL-MCNC: 7 G/DL (ref 6.4–8.2)
PT PNL PPP: 11.9 SEC (ref 9.7–13)
RBC # BLD AUTO: 5.06 M/MM3 (ref 4–5.6)
SODIUM SERPL-SCNC: 143 MMOL/L (ref 136–145)
WBC # BLD AUTO: 8.4 K/MM3 (ref 4–10)

## 2023-08-24 PROCEDURE — A9502 TC99M TETROFOSMIN: HCPCS

## 2023-08-24 PROCEDURE — G0378 HOSPITAL OBSERVATION PER HR: HCPCS

## 2023-08-25 LAB
ANION GAP SERPL CALC-SCNC: 8 MMOL/L (ref 8–16)
BASOPHILS # BLD: 0.8 % (ref 0–2)
BUN SERPL-MCNC: 20.7 MG/DL (ref 7–18)
CALCIUM SERPL-MCNC: 8.8 MG/DL (ref 8.5–10.1)
CHLORIDE SERPL-SCNC: 109 MMOL/L (ref 98–107)
CO2 SERPL-SCNC: 25 MMOL/L (ref 21–32)
CREAT SERPL-MCNC: 0.7 MG/DL (ref 0.55–1.3)
DEPRECATED RDW RBC AUTO: 13.6 % (ref 11.9–15.9)
EOSINOPHIL # BLD: 3.5 % (ref 0–4.5)
GLUCOSE SERPL-MCNC: 207 MG/DL (ref 74–106)
HCT VFR BLD CALC: 46.6 % (ref 35.4–49)
HGB BLD-MCNC: 15.9 GM/DL (ref 11.7–16.9)
LYMPHOCYTES # BLD: 48.9 % (ref 8–40)
MAGNESIUM SERPL-MCNC: 1.9 MG/DL (ref 1.8–2.4)
MCH RBC QN AUTO: 31.1 PG (ref 25.7–33.7)
MCHC RBC AUTO-ENTMCNC: 34.2 G/DL (ref 32–35.9)
MCV RBC: 90.9 FL (ref 80–96)
MONOCYTES # BLD AUTO: 6.6 % (ref 3.8–10.2)
NEUTROPHILS # BLD: 40.2 % (ref 42.8–82.8)
PLATELET # BLD AUTO: 170 10^3/UL (ref 134–434)
PMV BLD: 8.4 FL (ref 7.5–11.1)
POTASSIUM SERPLBLD-SCNC: 4 MMOL/L (ref 3.5–5.1)
RBC # BLD AUTO: 5.12 M/MM3 (ref 4–5.6)
SODIUM SERPL-SCNC: 143 MMOL/L (ref 136–145)
WBC # BLD AUTO: 8.3 K/MM3 (ref 4–10)

## 2023-08-25 PROCEDURE — 3E0333Z INTRODUCTION OF ANTI-INFLAMMATORY INTO PERIPHERAL VEIN, PERCUTANEOUS APPROACH: ICD-10-PCS | Performed by: FAMILY MEDICINE

## 2023-08-25 PROCEDURE — 3E013VG INTRODUCTION OF INSULIN INTO SUBCUTANEOUS TISSUE, PERCUTANEOUS APPROACH: ICD-10-PCS | Performed by: FAMILY MEDICINE

## 2023-08-25 PROCEDURE — 3E033GC INTRODUCTION OF OTHER THERAPEUTIC SUBSTANCE INTO PERIPHERAL VEIN, PERCUTANEOUS APPROACH: ICD-10-PCS | Performed by: FAMILY MEDICINE

## 2023-08-25 RX ADMIN — INSULIN ASPART SCH UNITS: 100 INJECTION, SOLUTION INTRAVENOUS; SUBCUTANEOUS at 22:48

## 2023-08-25 RX ADMIN — INSULIN ASPART SCH: 100 INJECTION, SOLUTION INTRAVENOUS; SUBCUTANEOUS at 11:10

## 2023-08-25 RX ADMIN — INSULIN ASPART SCH: 100 INJECTION, SOLUTION INTRAVENOUS; SUBCUTANEOUS at 16:41

## 2023-08-25 RX ADMIN — INSULIN ASPART SCH: 100 INJECTION, SOLUTION INTRAVENOUS; SUBCUTANEOUS at 08:00

## 2023-08-26 VITALS — TEMPERATURE: 98.2 F | RESPIRATION RATE: 20 BRPM

## 2023-08-26 VITALS — SYSTOLIC BLOOD PRESSURE: 107 MMHG | HEART RATE: 65 BPM | DIASTOLIC BLOOD PRESSURE: 61 MMHG

## 2023-08-26 LAB
ANION GAP SERPL CALC-SCNC: 6 MMOL/L (ref 8–16)
BASOPHILS # BLD: 0.3 % (ref 0–2)
BUN SERPL-MCNC: 22.4 MG/DL (ref 7–18)
CALCIUM SERPL-MCNC: 8.9 MG/DL (ref 8.5–10.1)
CHLORIDE SERPL-SCNC: 108 MMOL/L (ref 98–107)
CO2 SERPL-SCNC: 28 MMOL/L (ref 21–32)
CREAT SERPL-MCNC: 0.6 MG/DL (ref 0.55–1.3)
DEPRECATED RDW RBC AUTO: 13.4 % (ref 11.9–15.9)
EOSINOPHIL # BLD: 3.9 % (ref 0–4.5)
GLUCOSE SERPL-MCNC: 170 MG/DL (ref 74–106)
HBV SURFACE AG SERPL QL CFM: (no result)
HCT VFR BLD CALC: 46 % (ref 35.4–49)
HGB BLD-MCNC: 15.1 GM/DL (ref 11.7–16.9)
LYMPHOCYTES # BLD: 47.4 % (ref 8–40)
MCH RBC QN AUTO: 30.6 PG (ref 25.7–33.7)
MCHC RBC AUTO-ENTMCNC: 32.8 G/DL (ref 32–35.9)
MCV RBC: 93 FL (ref 80–96)
MONOCYTES # BLD AUTO: 8.1 % (ref 3.8–10.2)
NEUTROPHILS # BLD: 40.3 % (ref 42.8–82.8)
PLATELET # BLD AUTO: 168 10^3/UL (ref 134–434)
PMV BLD: 9.1 FL (ref 7.5–11.1)
POTASSIUM SERPLBLD-SCNC: 4.1 MMOL/L (ref 3.5–5.1)
RBC # BLD AUTO: 4.95 M/MM3 (ref 4–5.6)
SODIUM SERPL-SCNC: 141 MMOL/L (ref 136–145)
WBC # BLD AUTO: 7.9 K/MM3 (ref 4–10)

## 2023-08-26 RX ADMIN — INSULIN ASPART SCH UNITS: 100 INJECTION, SOLUTION INTRAVENOUS; SUBCUTANEOUS at 06:22
